# Patient Record
Sex: MALE | Race: WHITE | URBAN - METROPOLITAN AREA
[De-identification: names, ages, dates, MRNs, and addresses within clinical notes are randomized per-mention and may not be internally consistent; named-entity substitution may affect disease eponyms.]

---

## 2017-04-25 ENCOUNTER — OFFICE VISIT (OUTPATIENT)
Dept: OPHTHALMOLOGY | Facility: CLINIC | Age: 62
End: 2017-04-25
Attending: OPHTHALMOLOGY
Payer: MEDICARE

## 2017-04-25 DIAGNOSIS — H21.9 LESION OF IRIS: Primary | ICD-10-CM

## 2017-04-25 DIAGNOSIS — H21.9 LESION OF IRIS: ICD-10-CM

## 2017-04-25 PROCEDURE — 92285 EXTERNAL OCULAR PHOTOGRAPHY: CPT | Mod: ZF | Performed by: OPHTHALMOLOGY

## 2017-04-25 PROCEDURE — 76513 OPH US DX ANT SGM US UNI/BI: CPT | Mod: ZF,RT | Performed by: OPHTHALMOLOGY

## 2017-04-25 PROCEDURE — 99213 OFFICE O/P EST LOW 20 MIN: CPT | Mod: 25,ZF

## 2017-04-25 ASSESSMENT — REFRACTION_WEARINGRX
OD_AXIS: 115
OD_SPHERE: -6.00
SPECS_TYPE: PAL
OS_CYLINDER: +1.75
OS_AXIS: 052
OS_ADD: +2.25
OD_CYLINDER: +1.75
OD_ADD: +2.25
OS_SPHERE: -6.25

## 2017-04-25 ASSESSMENT — VISUAL ACUITY
OD_CC+: -2
OS_CC+: -2
OS_CC: 20/20
METHOD: SNELLEN - LINEAR
CORRECTION_TYPE: GLASSES
OD_CC: 20/20

## 2017-04-25 ASSESSMENT — TONOMETRY
OS_IOP_MMHG: 09
OD_IOP_MMHG: 12
IOP_METHOD: ICARE

## 2017-04-25 ASSESSMENT — SLIT LAMP EXAM - LIDS
COMMENTS: PTOSIS
COMMENTS: PTOSIS

## 2017-04-25 ASSESSMENT — CONF VISUAL FIELD
OD_NORMAL: 1
METHOD: COUNTING FINGERS
OS_NORMAL: 1

## 2017-04-25 NOTE — MR AVS SNAPSHOT
After Visit Summary   2017    Brian Novak    MRN: [id number]           Patient Information     Date Of Birth          1955        Visit Information        Provider Department      2017 1:45 PM [facility]        Today's Diagnoses     Lesion of iris    -  1    Lesion of iris - Right Eye           Follow-ups after your visit        Your next 10 appointments already scheduled     2018 10:15 AM CST   RETURN CORNEA with Gabe Perez MD   Eye Clinic ([facility])    [facility]  [address]  [address]  [place] [address]   [phone]              Who to contact     Please call your clinic at [phone] to:    Ask questions about your health    Make or cancel appointments    Discuss your medicines    Learn about your test results    Speak to your doctor   If you have compliments or concerns about an experience at your clinic, or if you wish to file a complaint, please contact [facility] Patient Relations at [phone] or email us at [email or web address]         Additional Information About Your Visit        MyChart Information     [employer] is an electronic gateway that provides easy, online access to your medical records. With [employer], you can request a clinic appointment, read your test results, renew a prescription or communicate with your care team.     To sign up for [employer] visit the website at [email or web address]   You will be asked to enter the access code listed below, as well as some personal information. Please follow the directions to create your username and password.     Your access code is: [email or web address]  Expires: 2017  8:30 AM     Your access code will  in 90 days. If you need help or a new code, please contact your [facility] or call [phone] for assistance.        Care EveryWhere ID     This is your Care  EveryWhere ID. This could be used by other organizations to access your [place] medical records  [id number]         Blood Pressure from Last 3 Encounters:   04/17/08 94/63   01/31/08 112/66   10/13/06 102/62    Weight from Last 3 Encounters:   04/17/08 99.8 kg (220 lb)   01/31/08 100.2 kg (221 lb)   10/13/06 86.2 kg (190 lb)              We Performed the Following     UBM-Anterior Segment US OD (right eye)        Primary Care Provider    None Specified       No primary provider on file.        Thank you!     Thank you for choosing [facility]IN[facility]  for your care. Our goal is always to provide you with excellent care. Hearing back from our patients is one way we can continue to improve our services. Please take a few minutes to complete the written survey that you may receive in the mail after your visit with us. Thank you!             Your Updated Medication List - Protect others around you: Learn how to safely use, store and throw away your medicines at www.disposemymeds.org.          This list is accurate as of: 4/25/17  4:32 PM.  Always use your most recent med list.                   Brand Name Dispense Instructions for use    ascorbic acid 500 MG Tabs     60    1 TABLET TWICE DAILY WITH MEALS       BACLOFEN IN INTERNAL INTRATHECAL PUMP      by Intrathecal route continuous prn       Ferrous Sulfate 324 MG tablet     30    1 TABLET DAILY with meals       ibuprofen 600 MG tablet    ADVIL/MOTRIN    120    1 tab po TID PRN       metoprolol 25 MG 24 hr tablet    TOPROL-XL    30    1 TABLET DAILY; please make an appointment       NYSTOP [id number] UNIT/GM Powd   Generic drug:  nystatin      APPLY TO GROIN AREA TWICE DAILY AS NEEDED, X=NOT NEEDE       order for DME     1    [place]oho for commode- use as directed       OYSTER JARAD 500 MG Tabs      1 tab po TID       Senna Tabs      1 tab po BID

## 2017-04-25 NOTE — NURSING NOTE
Chief Complaints and History of Present Illnesses   Patient presents with     Follow Up For     5 month follow up Lesion of Iris right eye.     HPI    Affected eye(s):  Both   Symptoms:     (Comment: Vision is unchanged BE.)   No floaters   No flashes   No redness   No tearing   No itching   No burning         Do you have eye pain now?:  No      Comments:  5 month follow up Lesion of Iris right eye.    Tyler CHU  1:57 PM04/25/2017

## 2017-04-25 NOTE — PROGRESS NOTES
CC: Iris lesion OD followup.     Iris lesion noted on exam by Dr. Marley 10/7/16.     No vision changes, eye pain, redness, tearing.    No prior eye surgeries.    Quadriplegia C5-7.    A/P:    1. Iris lesion RE. Benign appearance most c/w iris nevus (well circumscribed, homogenous pigmentation); no concerning features such as ectropion uvea, intrinsic/prominent vasculature, etc.   2. Ptosis OU.    slit lamp photos taken. UBM taken.   Appears stable.     F/u in 6 months for repeat slit lamp photos, UBM.     Marcos Sands MD  Ophthalmology resident, PGY-3        Complete documentation of historical and exam elements from today's encounter can be found in the full encounter summary report (not reduplicated in this progress note). I personally obtained the chief complaint(s) and history of present illness.  I confirmed and edited as necessary the review of systems, past medical/surgical history, family history, social history, and examination findings as documented by others.  I examined the patient myself, and I personally reviewed the relevant tests, images, and reports as documented above. I formulated and edited as necessary the assessment and plan and discussed the findings and management plan with the patient and family.     I personally interpreted the diagnostic / imaging study and have edited the interpretation as needed.    Gabe Perez MD, MA  Director, Cornea & Anterior Segment  [facility] Department of Ophthalmology & Visual Neuroscience

## 2018-01-23 DIAGNOSIS — H21.9 LESION OF IRIS: Primary | ICD-10-CM

## 2021-05-29 ENCOUNTER — RECORDS - HEALTHEAST (OUTPATIENT)
Dept: ADMINISTRATIVE | Facility: CLINIC | Age: 66
End: 2021-05-29